# Patient Record
Sex: MALE | Race: WHITE | URBAN - METROPOLITAN AREA
[De-identification: names, ages, dates, MRNs, and addresses within clinical notes are randomized per-mention and may not be internally consistent; named-entity substitution may affect disease eponyms.]

---

## 2019-09-05 ENCOUNTER — OFFICE VISIT (OUTPATIENT)
Dept: OPTOMETRY | Facility: CLINIC | Age: 69
End: 2019-09-05
Payer: COMMERCIAL

## 2019-09-05 DIAGNOSIS — H10.11 ALLERGIC CONJUNCTIVITIS OF RIGHT EYE: ICD-10-CM

## 2019-09-05 DIAGNOSIS — H00.021 HORDEOLUM INTERNUM OF RIGHT UPPER EYELID: Primary | ICD-10-CM

## 2019-09-05 PROCEDURE — 92002 INTRM OPH EXAM NEW PATIENT: CPT | Performed by: OPTOMETRIST

## 2019-09-05 ASSESSMENT — VISUAL ACUITY
CORRECTION_TYPE: CONTACTS
OD_CC+: -1
OS_CC: 20/60
OS_CC+: -2
METHOD: SNELLEN - LINEAR
OD_CC: 20/20

## 2019-09-05 ASSESSMENT — SLIT LAMP EXAM - LIDS: COMMENTS: CLEAR

## 2019-09-05 ASSESSMENT — EXTERNAL EXAM - LEFT EYE: OS_EXAM: NORMAL

## 2019-09-05 ASSESSMENT — EXTERNAL EXAM - RIGHT EYE: OD_EXAM: NORMAL

## 2019-09-05 NOTE — PATIENT INSTRUCTIONS
Hot compresses for 10-15 minutes at a time for 4 x day.  Overabundance of bacterial microorganisms along the eyelashes and lid margins induce stress on the tear film and promote inflammation.  Regular lid hygiene helps diminish the bacterial population to prevent inflammation and infection.  Use a warm compress to loosen crusts   Cleanse lids once daily with a lid cleansing product as directed such as Ocusoft or Sterilid which can be purchased at most pharmacies. Diluted baby shampoo will also work, but not as well as it dries the skin and can irritate eyelids.  Hypo chlor spray may also be used on closed lids.          Patient Education     Sty (or Stye)  A sty is an infection of the oil gland of the eyelid. It may develop into a small pocket of pus (an abscess). This can cause pain, redness, and swelling. In early stages, a sty is treated with antibiotic cream, eye drops, or a small towel soaked in warm water (a warm compress). More severe cases may need to be opened and drained by a healthcare provider.  Home care    Eye drops or ointment are usually prescribed to treat the infection. Use these as directed.     Artificial tears may also be used to lubricate the eye and make it more comfortable. You can buy these over the counter without a prescription. Talk with your healthcare provider before using any over-the-counter treatment for a sty.    Apply a warm, damp towel to the affected eye for at least 5 minutes, 3 to 4 times a day for a week. Warm compresses open the pores and speed the healing. But if the compresses are too hot, they may burn your eyelid.    Sometimes the sty will drain with this treatment alone. If this happens, keep using the antibiotic until all the redness and swelling are gone.    Wash your hands before and after touching the infected eyelid to avoid spreading the infection.    Don t squeeze or try to break open the sty.  Follow-up care  Follow up with your healthcare provider, or as  advised.   When to seek medical advice  Call your healthcare provider right away if any of these occur:    Increase in swelling or redness around the eyelid after 48 to 72 hours    Increase in eye pain or the eyelid blisters    Increase in warmth--the eyelid feels hot    Drainage of blood or thick pus from the sty    Blister on the eyelid    Inability to open the eyelid due to swelling    Fever of 100.4 F (38 C) or above, or as directed by your provider    Vision changes    Headache or stiff neck    The sty comes back  Date Last Reviewed: 8/1/2017 2000-2018 [employer]. [address] [place] [address]. All rights reserved. This information is not intended as a substitute for professional medical care. Always follow your healthcare professional's instructions.

## 2019-09-05 NOTE — PROGRESS NOTES
Chief Complaint   Patient presents with     Eye Infection Right Eye     Chief Complaint         Eye Infection Right Eye   HPI    Eye Infection Right Eye      Laterality:  right eye     Associated symptoms:  irritation, itching, tearing, and discharge     Frequency:  constantly     Duration:  1 day     Course:  stable     Context:  contact lens wear   Comments      Patient wears rgp's wore from 5am- 9pm yesterday od eye felt a little better after took out contacts. Same thing happened like 10 years ago.   Daina Linn, Optometric Assistant, [facility]     New pair of gas perms about 2 mos  No history of trauma  History of stye  No history of allergies  History of gpc from contact lens wear with soft dailies  May have gotten grass in eye    Medical, surgical and family histories reviewed and updated 9/5/2019.       OBJECTIVE: See Ophthalmology exam    ASSESSMENT:    ICD-10-CM    1. Hordeolum internum of right upper eyelid H00.021    2. Allergic conjunctivitis of right eye H10.11       PLAN:   Warm compresses/ lid hygiene until resolved  If gets bigger in the next couple of days, call     Ruth Kya OD

## 2019-09-05 NOTE — LETTER
9/5/2019         RE: Derek Larios  [address]  [place] [address]        Dear Colleague,    [facility] you [facility]. Please see a copy of my visit note below.    Chief Complaint   Patient presents with     Eye Infection Right Eye     Chief Complaint         Eye Infection Right Eye   HPI    Eye Infection Right Eye      Laterality:  right eye     Associated symptoms:  irritation, itching, tearing, and discharge     Frequency:  constantly     Duration:  1 day     Course:  stable     Context:  contact lens wear   Comments      Patient wears rgp's wore from 5am- 9pm yesterday od eye felt a little better after took out contacts. Same thing happened like 10 years ago.   Daina Linn, Optometric Assistant, [facility]     New pair of gas perms about 2 mos  No history of trauma  History of stye  No history of allergies  History of gpc from contact lens wear with soft dailies  May have gotten grass in eye    Medical, surgical and family histories reviewed and updated 9/5/2019.       OBJECTIVE: See Ophthalmology exam    ASSESSMENT:    ICD-10-CM    1. Hordeolum internum of right upper eyelid H00.021    2. Allergic conjunctivitis of right eye H10.11       PLAN:   Warm compresses/ lid hygiene until resolved  If gets bigger in the next couple of days, call     Ruth Kay OD     Again, thank you for allowing me to participate in the care of your patient.        Sincerely,        Ruth Kay, OD

## 2021-03-06 ENCOUNTER — HEALTH MAINTENANCE LETTER (OUTPATIENT)
Age: 71
End: 2021-03-06

## 2021-10-09 ENCOUNTER — HEALTH MAINTENANCE LETTER (OUTPATIENT)
Age: 71
End: 2021-10-09

## 2022-03-20 ENCOUNTER — HEALTH MAINTENANCE LETTER (OUTPATIENT)
Age: 72
End: 2022-03-20

## 2022-09-11 ENCOUNTER — HEALTH MAINTENANCE LETTER (OUTPATIENT)
Age: 72
End: 2022-09-11

## 2023-05-06 ENCOUNTER — HEALTH MAINTENANCE LETTER (OUTPATIENT)
Age: 73
End: 2023-05-06